# Patient Record
Sex: MALE | Race: BLACK OR AFRICAN AMERICAN | URBAN - METROPOLITAN AREA
[De-identification: names, ages, dates, MRNs, and addresses within clinical notes are randomized per-mention and may not be internally consistent; named-entity substitution may affect disease eponyms.]

---

## 2022-06-27 ENCOUNTER — TELEPHONE (OUTPATIENT)
Dept: CARDIOLOGY CLINIC | Age: 62
End: 2022-06-27

## 2022-06-27 NOTE — TELEPHONE ENCOUNTER
Patient last seen 2016. Called [employer] and informed. Tried to reach [employer], no one answered, no voice mail.  Will try again later//[email or web address]

## 2022-06-27 NOTE — TELEPHONE ENCOUNTER
Patient having Colonoscopy on 08/11/22 with  . Needs Plavix hold for 5 days prior.  Fax: [phone]

## 2022-06-29 ENCOUNTER — TELEPHONE (OUTPATIENT)
Dept: CARDIOLOGY CLINIC | Age: 62
End: 2022-06-29

## 2022-06-29 NOTE — TELEPHONE ENCOUNTER
Called and spoke with Bonilla Velasco, informed her that patient has not been seen since 2016.  Will need to make an appointment//[email or web address]

## 2022-06-29 NOTE — TELEPHONE ENCOUNTER
[facility] called stating that the patient is now being cared for by [facility]. No further action is needed for the surgical clearance.